# Patient Record
Sex: FEMALE | Race: WHITE | NOT HISPANIC OR LATINO | Employment: UNEMPLOYED | URBAN - METROPOLITAN AREA
[De-identification: names, ages, dates, MRNs, and addresses within clinical notes are randomized per-mention and may not be internally consistent; named-entity substitution may affect disease eponyms.]

---

## 2017-12-12 ENCOUNTER — HOSPITAL ENCOUNTER (EMERGENCY)
Facility: HOSPITAL | Age: 62
Discharge: HOME | End: 2017-12-12
Attending: EMERGENCY MEDICINE

## 2017-12-12 VITALS
SYSTOLIC BLOOD PRESSURE: 219 MMHG | RESPIRATION RATE: 18 BRPM | DIASTOLIC BLOOD PRESSURE: 106 MMHG | TEMPERATURE: 98 F | OXYGEN SATURATION: 97 % | BODY MASS INDEX: 32.49 KG/M2 | WEIGHT: 220 LBS | HEART RATE: 76 BPM

## 2017-12-12 DIAGNOSIS — M79.673 FOOT PAIN: ICD-10-CM

## 2017-12-12 DIAGNOSIS — M25.579 ANKLE PAIN: ICD-10-CM

## 2017-12-12 DIAGNOSIS — S90.32XA CONTUSION OF LEFT FOOT, INITIAL ENCOUNTER: Primary | ICD-10-CM

## 2017-12-12 DIAGNOSIS — S93.491A SPRAIN OF OTHER LIGAMENT OF RIGHT ANKLE, INITIAL ENCOUNTER: ICD-10-CM

## 2017-12-12 DIAGNOSIS — Z72.0 TOBACCO ABUSE DISORDER: ICD-10-CM

## 2017-12-12 DIAGNOSIS — I10 ESSENTIAL HYPERTENSION: ICD-10-CM

## 2017-12-12 PROCEDURE — 99283 EMERGENCY DEPT VISIT LOW MDM: CPT

## 2017-12-12 RX ORDER — TRAMADOL HYDROCHLORIDE 50 MG/1
50 TABLET ORAL EVERY 6 HOURS PRN
Qty: 12 TABLET | Refills: 0 | Status: SHIPPED | OUTPATIENT
Start: 2017-12-12 | End: 2017-12-22

## 2017-12-12 RX ORDER — AMLODIPINE BESYLATE 10 MG/1
10 TABLET ORAL DAILY
Qty: 30 TABLET | Refills: 1 | Status: SHIPPED | OUTPATIENT
Start: 2017-12-12 | End: 2018-12-12

## 2017-12-12 NOTE — ED PROVIDER NOTES
SCRIBE #1 NOTE: I, Liana Villa, am scribing for, and in the presence of, Bisi Ellis MD. I have scribed the entire note.      History      Chief Complaint   Patient presents with    Fall     bilateral foot pain after slip and fall       Review of patient's allergies indicates:   Allergen Reactions    Codeine     Oral contrast [contrast media]     Sulfa (sulfonamide antibiotics)         HPI   HPI    2017, 4:45 PM   History obtained from the patient      History of Present Illness: Dax Crane is a 62 y.o. female patient who presents to the Emergency Department for R ankle swelling which onset gradually earlier today. Symptoms are constant and moderate in severity. Pt states she tripped while going down stairs and fell, denies hitting her head or LOC.  No mitigating or exacerbating factors reported. Associated sxs include bilateral foot pain. Patient denies any head injury/trauma, LOC, HA, numbness, weakness, dizziness, back pain, neck pain, knee pain, hip pain, abd pain, CP, SOB, and all other sxs at this time. No further complaints or concerns at this time.       Arrival mode: Personal vehicle      PCP: Primary Doctor No       Past Medical History:  Past Medical History:   Diagnosis Date    Hypertension        Past Surgical History:  Past Surgical History:   Procedure Laterality Date     SECTION           Family History:  History reviewed. No pertinent family history.    Social History:  Social History     Social History Main Topics    Smoking status: Current Every Day Smoker     Packs/day: 1.00     Types: Cigarettes    Smokeless tobacco: Not given    Alcohol use No    Drug use: No    Sexual activity: Not given       ROS   Review of Systems   Constitutional: Negative for fever.        (-) head trauma     HENT: Negative for sore throat.    Respiratory: Negative for shortness of breath.    Cardiovascular: Negative for chest pain.   Gastrointestinal: Negative for abdominal pain, nausea  and vomiting.   Genitourinary: Negative for dysuria.   Musculoskeletal: Positive for arthralgias (bilateral feet) and joint swelling (R ankle). Negative for back pain and neck pain.        (-) knee pain  (-) hip pain   Skin: Negative for rash.   Neurological: Negative for dizziness, syncope, weakness, numbness and headaches.   Hematological: Does not bruise/bleed easily.   All other systems reviewed and are negative.      Physical Exam      Initial Vitals [12/12/17 1538]   BP Pulse Resp Temp SpO2   (!) 212/106 76 18 98 °F (36.7 °C) 97 %      MAP       141.33          Physical Exam  Nursing Notes and Vital Signs Reviewed.  Constitutional: Patient is in no apparent distress. Well-developed and well-nourished.  Head: Atraumatic. Normocephalic.  Eyes: PERRL. EOM intact. Conjunctivae are not pale. No scleral icterus.  ENT: Mucous membranes are moist. Oropharynx is clear and symmetric.    Neck: Supple. Full ROM. No lymphadenopathy.  Cardiovascular: Regular rate. Regular rhythm. No murmurs, rubs, or gallops. Distal pulses are 2+ and symmetric.  Pulmonary/Chest: No respiratory distress. Clear to auscultation bilaterally. No wheezing or rales.  Abdominal: Soft and non-distended.  There is no tenderness.  No rebound, guarding, or rigidity. Good bowel sounds.  Genitourinary: No CVA tenderness  Musculoskeletal: Moves all extremities. No obvious deformities. No edema. No calf tenderness.  RIGHT Ankle: Mild swelling and tenderness to right ankle. No obvious deformity, bruising or laceration. Intact sensation to light touch.   Left foot: small amount of bruising to dorsal aspect of L foot between the 2nd and 3rd metatarsals. Distal capillary refill takes less than 2 seconds.  PT and DP pulses are 2+ bilaterally.  Skin: Warm and dry.  Neurological:  Alert, awake, and appropriate.  Normal speech.  No acute focal neurological deficits are appreciated.  Psychiatric: Normal affect. Good eye contact. Appropriate in content.    ED Course     Procedures  ED Vital Signs:  Vitals:    12/12/17 1538 12/12/17 1713   BP: (!) 212/106 (!) 219/106   Pulse: 76    Resp: 18 18   Temp: 98 °F (36.7 °C)    TempSrc: Oral    SpO2: 97% 97%   Weight: 99.8 kg (220 lb)          Imaging Results:  Imaging Results          X-Ray Foot Complete Left (Final result)  Result time 12/12/17 17:27:32    Final result by ORLY Yañez Sr., MD (12/12/17 17:27:32)                 Impression:         There is a 12 mm os naviculare.      Electronically signed by: ORLY YAÑEZ MD  Date:     12/12/17  Time:    17:27              Narrative:    3 view x-ray of the left foot    Clinical History:     Pain in unspecified foot    Findings:     There is no fracture. There is no dislocation. There is a 12 mm os naviculare.                             X-Ray Ankle Complete Right (Final result)  Result time 12/12/17 17:33:55    Final result by ORLY Yañez Sr., MD (12/12/17 17:33:55)                 Impression:         There is mild prominence of the soft tissue thickness lateral to the ankle. There are several subtle areas of relative decreased density in the soft tissue of lateral to the calcaneus. If additional imaging evaluation is clinically indicated, I recommend consideration of an MRI examination of the right lower extremity without and with IV contrast.      Electronically signed by: ORLY YAÑEZ MD  Date:     12/12/17  Time:    17:33              Narrative:    Three-view x-ray of the right ankle    Clinical History:     Pain in unspecified ankle and joints of unspecified foot    Findings:     There is no fracture. There is no dislocation. There is mild prominence of the soft tissue thickness lateral to the ankle. There are several subtle areas of relative decreased density in the soft tissue of lateral to the calcaneus.                                      The Emergency Provider reviewed the vital signs and test results, which are outlined above.    ED Discussion     6:12 PM: Reassessed  pt at this time. Discussed with pt all pertinent ED information and results. Discussed pt dx and plan of tx. Gave pt all f/u and return to the ED instructions. All questions and concerns were addressed at this time. Pt expresses understanding of information and instructions, and is comfortable with plan to discharge. Pt is stable for discharge.    Pre-hypertension/Hypertension: The pt has been informed that they may have pre-hypertension or hypertension based on a blood pressure reading in the ED. Informed pt on need to be compliant with medications. I recommend that the pt follow-up with PCP in 1-2 days for recheck and further evaluation of possible pre-hypertension or hypertension.       ED Medication(s):  Medications - No data to display    Current Discharge Medication List          Follow-up Information     Located within [facility]. Schedule an appointment as soon as possible for a visit in 1 day.    Why:  Return to the Emergency Room, If symptoms worsen  Contact information:  [address] [facility] [address]  [phone]                     Medical Decision Making    Medical Decision Making:   Clinical Tests:   Radiological Study: Reviewed and Ordered     Additional MDM:   Smoking Cessation: The patient was counseled on tobacco related  health complications.        Scribe Attestation:   Scribe #1: I performed the above scribed service and the documentation accurately describes the services I performed. I attest to the accuracy of the note.    Attending:   Physician Attestation Statement for Scribe #1: I, Bisi Ellis MD, personally performed the services described in this documentation, as scribed by Liana Villa, in my presence, and it is both accurate and complete.          Clinical Impression       ICD-10-CM ICD-9-CM   1. Contusion of left foot, initial encounter S90.32XA 924.20   2. Foot pain M79.673 729.5   3. Ankle pain M25.579 719.47   4. Sprain of other ligament of right ankle,  initial encounter S93.491A 845.09   5. Essential hypertension I10 401.9   6. Tobacco abuse disorder Z72.0 305.1       Disposition:   Disposition: Discharged  Condition: Stable         Bisi Ellis MD  12/12/17 3850

## 2017-12-13 NOTE — ED NOTES
Pt refused ace wraps, refused prescription for pain med and blood pressure. Pedal pulse positive.